# Patient Record
Sex: FEMALE | Race: OTHER | HISPANIC OR LATINO | Employment: FULL TIME | URBAN - METROPOLITAN AREA
[De-identification: names, ages, dates, MRNs, and addresses within clinical notes are randomized per-mention and may not be internally consistent; named-entity substitution may affect disease eponyms.]

---

## 2025-04-02 ENCOUNTER — OFFICE VISIT (OUTPATIENT)
Dept: WOUND CARE | Facility: CLINIC | Age: 40
End: 2025-04-02
Payer: COMMERCIAL

## 2025-04-02 PROCEDURE — 99213 OFFICE O/P EST LOW 20 MIN: CPT

## 2025-04-04 ENCOUNTER — HOSPITAL ENCOUNTER (OUTPATIENT)
Dept: RADIOLOGY | Facility: HOSPITAL | Age: 40
Discharge: HOME | End: 2025-04-04
Payer: COMMERCIAL

## 2025-04-04 DIAGNOSIS — M86.171 OTHER ACUTE OSTEOMYELITIS, RIGHT ANKLE AND FOOT (MULTI): ICD-10-CM

## 2025-04-04 DIAGNOSIS — M86.271 SUBACUTE OSTEOMYELITIS, RIGHT ANKLE AND FOOT (MULTI): ICD-10-CM

## 2025-04-04 PROCEDURE — 36569 INSJ PICC 5 YR+ W/O IMAGING: CPT

## 2025-04-04 PROCEDURE — C1751 CATH, INF, PER/CENT/MIDLINE: HCPCS

## 2025-04-04 PROCEDURE — 2780000003 HC OR 278 NO HCPCS

## 2025-04-08 PROCEDURE — 85025 COMPLETE CBC W/AUTO DIFF WBC: CPT

## 2025-04-08 PROCEDURE — 86140 C-REACTIVE PROTEIN: CPT

## 2025-04-08 PROCEDURE — 80048 BASIC METABOLIC PNL TOTAL CA: CPT

## 2025-04-09 ENCOUNTER — LAB REQUISITION (OUTPATIENT)
Dept: LAB | Facility: HOSPITAL | Age: 40
End: 2025-04-09
Payer: COMMERCIAL

## 2025-04-09 DIAGNOSIS — M86.271 SUBACUTE OSTEOMYELITIS, RIGHT ANKLE AND FOOT (MULTI): ICD-10-CM

## 2025-04-09 LAB
ANION GAP SERPL CALC-SCNC: 11 MMOL/L (ref 10–20)
BASOPHILS # BLD AUTO: 0.02 X10*3/UL (ref 0–0.1)
BASOPHILS NFR BLD AUTO: 0.3 %
BUN SERPL-MCNC: 16 MG/DL (ref 6–23)
CALCIUM SERPL-MCNC: 9.3 MG/DL (ref 8.6–10.6)
CHLORIDE SERPL-SCNC: 107 MMOL/L (ref 98–107)
CO2 SERPL-SCNC: 24 MMOL/L (ref 21–32)
CREAT SERPL-MCNC: 0.47 MG/DL (ref 0.5–1.05)
CRP SERPL-MCNC: 0.17 MG/DL
EGFRCR SERPLBLD CKD-EPI 2021: >90 ML/MIN/1.73M*2
EOSINOPHIL # BLD AUTO: 0.13 X10*3/UL (ref 0–0.7)
EOSINOPHIL NFR BLD AUTO: 2 %
ERYTHROCYTE [DISTWIDTH] IN BLOOD BY AUTOMATED COUNT: 13.3 % (ref 11.5–14.5)
GLUCOSE SERPL-MCNC: 126 MG/DL (ref 74–99)
HCT VFR BLD AUTO: 35.4 % (ref 36–46)
HGB BLD-MCNC: 11.5 G/DL (ref 12–16)
IMM GRANULOCYTES # BLD AUTO: 0.03 X10*3/UL (ref 0–0.7)
IMM GRANULOCYTES NFR BLD AUTO: 0.5 % (ref 0–0.9)
LYMPHOCYTES # BLD AUTO: 1.73 X10*3/UL (ref 1.2–4.8)
LYMPHOCYTES NFR BLD AUTO: 26.8 %
MCH RBC QN AUTO: 29.6 PG (ref 26–34)
MCHC RBC AUTO-ENTMCNC: 32.5 G/DL (ref 32–36)
MCV RBC AUTO: 91 FL (ref 80–100)
MONOCYTES # BLD AUTO: 0.54 X10*3/UL (ref 0.1–1)
MONOCYTES NFR BLD AUTO: 8.4 %
NEUTROPHILS # BLD AUTO: 4.01 X10*3/UL (ref 1.2–7.7)
NEUTROPHILS NFR BLD AUTO: 62 %
NRBC BLD-RTO: 0 /100 WBCS (ref 0–0)
PLATELET # BLD AUTO: 244 X10*3/UL (ref 150–450)
POTASSIUM SERPL-SCNC: 3.8 MMOL/L (ref 3.5–5.3)
RBC # BLD AUTO: 3.89 X10*6/UL (ref 4–5.2)
SODIUM SERPL-SCNC: 138 MMOL/L (ref 136–145)
WBC # BLD AUTO: 6.5 X10*3/UL (ref 4.4–11.3)

## 2025-04-16 ENCOUNTER — APPOINTMENT (OUTPATIENT)
Dept: WOUND CARE | Facility: CLINIC | Age: 40
End: 2025-04-16
Payer: COMMERCIAL

## 2025-04-23 ENCOUNTER — OFFICE VISIT (OUTPATIENT)
Dept: WOUND CARE | Facility: CLINIC | Age: 40
End: 2025-04-23
Payer: COMMERCIAL

## 2025-04-23 PROCEDURE — 99213 OFFICE O/P EST LOW 20 MIN: CPT

## 2025-05-07 ENCOUNTER — OFFICE VISIT (OUTPATIENT)
Dept: WOUND CARE | Facility: CLINIC | Age: 40
End: 2025-05-07
Payer: COMMERCIAL

## 2025-05-07 PROCEDURE — 99213 OFFICE O/P EST LOW 20 MIN: CPT

## 2025-07-02 ENCOUNTER — PRE-ADMISSION TESTING (OUTPATIENT)
Dept: PREADMISSION TESTING | Facility: HOSPITAL | Age: 40
End: 2025-07-02
Payer: COMMERCIAL

## 2025-07-02 VITALS
HEIGHT: 66 IN | OXYGEN SATURATION: 99 % | RESPIRATION RATE: 16 BRPM | TEMPERATURE: 95 F | WEIGHT: 200.62 LBS | SYSTOLIC BLOOD PRESSURE: 147 MMHG | BODY MASS INDEX: 32.24 KG/M2 | HEART RATE: 89 BPM | DIASTOLIC BLOOD PRESSURE: 88 MMHG

## 2025-07-02 DIAGNOSIS — L97.512 ULCER OF TOE OF RIGHT FOOT, WITH FAT LAYER EXPOSED (MULTI): ICD-10-CM

## 2025-07-02 DIAGNOSIS — M21.961: ICD-10-CM

## 2025-07-02 DIAGNOSIS — Z01.818 PRE-OP TESTING: Primary | ICD-10-CM

## 2025-07-02 PROCEDURE — 99204 OFFICE O/P NEW MOD 45 MIN: CPT | Performed by: NURSE PRACTITIONER

## 2025-07-02 PROCEDURE — 93005 ELECTROCARDIOGRAM TRACING: CPT

## 2025-07-02 RX ORDER — MULTIVITAMIN/IRON/FOLIC ACID 18MG-0.4MG
1 TABLET ORAL DAILY
COMMUNITY

## 2025-07-02 RX ORDER — METFORMIN HYDROCHLORIDE 500 MG/1
500 TABLET ORAL
COMMUNITY

## 2025-07-02 RX ORDER — LISINOPRIL 2.5 MG/1
2.5 TABLET ORAL DAILY
COMMUNITY

## 2025-07-02 RX ORDER — ATORVASTATIN CALCIUM 40 MG/1
40 TABLET, FILM COATED ORAL DAILY
COMMUNITY

## 2025-07-02 RX ORDER — DULAGLUTIDE 4.5 MG/.5ML
INJECTION, SOLUTION SUBCUTANEOUS WEEKLY
COMMUNITY

## 2025-07-02 ASSESSMENT — DUKE ACTIVITY SCORE INDEX (DASI)
CAN YOU RUN A SHORT DISTANCE: YES
CAN YOU WALK A BLOCK OR TWO ON LEVEL GROUND: YES
CAN YOU PARTICIPATE IN MODERATE RECREATIONAL ACTIVITIES LIKE GOLF, BOWLING, DANCING, DOUBLES TENNIS OR THROWING A BASEBALL OR FOOTBALL: YES
CAN YOU DO LIGHT WORK AROUND THE HOUSE LIKE DUSTING OR WASHING DISHES: YES
DASI METS SCORE: 8.3
CAN YOU WALK INDOORS, SUCH AS AROUND YOUR HOUSE: YES
TOTAL_SCORE: 45.45
CAN YOU DO HEAVY WORK AROUND THE HOUSE LIKE SCRUBBING FLOORS OR LIFTING AND MOVING HEAVY FURNITURE: YES
CAN YOU PARTICIPATE IN STRENOUS SPORTS LIKE SWIMMING, SINGLES TENNIS, FOOTBALL, BASKETBALL, OR SKIING: NO
CAN YOU DO YARD WORK LIKE RAKING LEAVES, WEEDING OR PUSHING A MOWER: YES
CAN YOU CLIMB A FLIGHT OF STAIRS OR WALK UP A HILL: YES
CAN YOU DO MODERATE WORK AROUND THE HOUSE LIKE VACUUMING, SWEEPING FLOORS OR CARRYING GROCERIES: YES
CAN YOU TAKE CARE OF YOURSELF (EAT, DRESS, BATHE, OR USE TOILET): YES
CAN YOU HAVE SEXUAL RELATIONS: NO

## 2025-07-02 NOTE — CPM/PAT H&P
CPM/PAT Evaluation       Name: Letty Perez (Letty Perez)  /Age: 1985/40 y.o.     In-Person       Chief Complaint: PAT for planned Right foot surgery    40 yr old female w/PHx of HTN, HLD, DM II, obesity (BMI 32), Right foot deformity and toe ulcer referred to PAT for planned Right foot osteotomy w/mini c-arm and open wound excision preparation & recipient site, allograft skin application w/Dr Raphael on 2025      Patient reports feeling overall well, denies fever, cough or recent infection. Denies hx of stroke, seizure or blood clot.  Reports remaining otherwise physically active working a physical job lifting boxes; denies cardiac or respiratory symptoms. Past surgical hx includes elbow repair (as a child), tonsillectomy, cataract extraction, wisdom teeth extraction, Right ankle ORIF, colonoscopy and most recently PICC line placement/removal (2025); denies past issues with anesthesia.      Followed by PCP (Terri PERES) 3/2025  Followed by infectious disease (Mary Varghese MD) 2025  Followed by gastroenterology (Daysi PERES) 2/10/2025        Medical History[1]    Surgical History[2]    Patient  reports that she is not currently sexually active.    Family History[3]    Allergies[4]    Prior to Admission medications    Not on File        A ten-point review of systems was completed and is otherwise negative except for what is mentioned in the HPI above.    Physical Exam  Vitals reviewed.   Constitutional:       Appearance: Normal appearance.   HENT:      Head: Normocephalic.      Mouth/Throat:      Mouth: Mucous membranes are moist.   Eyes:      Pupils: Pupils are equal, round, and reactive to light.      Comments: Corrective lenses   Cardiovascular:      Rate and Rhythm: Normal rate and regular rhythm.   Pulmonary:      Effort: Pulmonary effort is normal.      Breath sounds: Normal breath sounds.   Abdominal:      General: Bowel sounds are normal.   Musculoskeletal:          "General: Normal range of motion.      Comments: BHAVIK continuous glucose monitor - showing result 133   Skin:     General: Skin is warm and dry.   Neurological:      Mental Status: She is alert and oriented to person, place, and time.   Psychiatric:         Mood and Affect: Mood normal.          PAT AIRWAY:   Airway:     Mallampati::  II    TM distance::  >3 FB    Neck ROM::  Full  normal        Testing/Diagnostic: CBC, BMP    Patient Specialist/PCP: Terri PERES (PCP) 3/2025; Mary Varghese MD (Infectious disease) 4/2025; Daysi PERES (gastroenterology) 2/10/2025    Visit Vitals  /88   Pulse 89   Temp 35 °C (95 °F) (Temporal)   Resp 16   Ht 1.676 m (5' 6\")   Wt 91 kg (200 lb 9.9 oz)   SpO2 99%   BMI 32.38 kg/m²   Smoking Status Never   BSA 2.06 m²       DASI Risk Score      Flowsheet Row Pre-Admission Testing from 7/2/2025 in [facility]   Can you take care of yourself (eat, dress, bathe, or use toilet)?  2.75 filed at 07/02/2025 0821   Can you walk indoors, such as around your house? 1.75 filed at 07/02/2025 0821   Can you walk a block or two on level ground?  2.75 filed at 07/02/2025 0821   Can you climb a flight of stairs or walk up a hill? 5.5 filed at 07/02/2025 0821   Can you run a short distance? 8 filed at 07/02/2025 0821   Can you do light work around the house like dusting or washing dishes? 2.7 filed at 07/02/2025 0821   Can you do moderate work around the house like vacuuming, sweeping floors or carrying groceries? 3.5 filed at 07/02/2025 0821   Can you do heavy work around the house like scrubbing floors or lifting and moving heavy furniture?  8 filed at 07/02/2025 0821   Can you do yard work like raking leaves, weeding or pushing a mower? 4.5 filed at 07/02/2025 0821   Can you have sexual relations? 0 filed at 07/02/2025 0821   Can you participate in moderate recreational activities like golf, bowling, dancing, doubles tennis or throwing a baseball or football? 6 filed at " 07/02/2025 0821   Can you participate in strenous sports like swimming, singles tennis, football, basketball, or skiing? 0 filed at 07/02/2025 0821   DASI SCORE 45.45 filed at 07/02/2025 0821   METS Score (Will be calculated only when all the questions are answered) 8.3 filed at 07/02/2025 0821          Caprini DVT Assessment    No data to display       Modified Frailty Index    No data to display       [id number] Stroke Risk Points  Current as of a minute ago        N/A 0 to 9 Points:      Last Change: N/A          The SCV3IY3-CMDy risk score (Lip ALEX, et al. 2009. © 2010 American [employer]) quantifies the risk of stroke for a patient with atrial fibrillation. For patients without atrial fibrillation or under the age of 18 this score appears as N/A. Higher score values generally indicate higher risk of stroke.        This score is not applicable to this patient. Components are not calculated.          Revised Cardiac Risk Index    No data to display       Apfel Simplified Score    No data to display       Risk Analysis Index Results This Encounter    No data found in the last 10 encounters.       Stop Bang Score      Flowsheet Row Pre-Admission Testing from 7/2/2025 in [facility]   Do you snore loudly? 0 filed at 07/02/2025 0827   Do you often feel tired or fatigued after your sleep? 0 filed at 07/02/2025 0827   Has anyone ever observed you stop breathing in your sleep? 0 filed at 07/02/2025 0827   Do you have or are you being treated for high blood pressure? 0 filed at 07/02/2025 0827   Recent BMI (Calculated) 32.4 filed at 07/02/2025 0827   Is BMI greater than 35 kg/m2? 0=No filed at 07/02/2025 0827   Age older than 50 years old? 0=No filed at 07/02/2025 0827   Is your neck circumference greater than 17 inches (Male) or 16 inches (Female)? 0 filed at 07/02/2025 0827   Gender - Male 0=No filed at 07/02/2025 0827   STOP-BANG Total Score 0 filed at 07/02/2025 0827          Prodigy:  High Risk  Total Score: 0          ARISCAT Score for Postoperative Pulmonary Complications      Flowsheet Row Pre-Admission Testing from 7/2/2025 in [facility]   Age Calculated Score 0 filed at 07/02/2025 1025   Preoperative SpO2 0 filed at 07/02/2025 1025   Respiratory infection in the last month Either upper or lower (i.e., URI, bronchitis, pneumonia), with fever and antibiotic treatment 0 filed at 07/02/2025 1025   Preoperative anemia (Hgb less than 10 g/dl) 0 filed at 07/02/2025 1025   Surgical incision  0 filed at 07/02/2025 1025   Duration of surgery  0 filed at 07/02/2025 1025   Emergency Procedure  0 filed at 07/02/2025 1025   ARISCAT Total Score  0 filed at 07/02/2025 1025          Alex Perioperative Risk for Myocardial Infarction or Cardiac Arrest (SHANEKA)      Flowsheet Row Pre-Admission Testing from 7/2/2025 in [facility]   Calculated Age Score 0.82 filed at 07/02/2025 1026   Functional Status  0 filed at 07/02/2025 1026   ASA Class  -3.29 filed at 07/02/2025 1026   Creatinine 0 filed at 07/02/2025 1026   Type of Procedure  0.80 filed at 07/02/2025 1026   SHANEKA Total Score  -6.92 filed at 07/02/2025 1026   SHANEKA % 0.1 filed at 07/02/2025 1026            Assessment and Plan:     # HTN - hold lisinopril 24 hrs before surgery  # HLD - continue statin  # DM II - hold Jardiance 3 full days before surgery, hold metformin morning of surgery, continue basaglar (1/2 dose for concerns of hypoglycemia), on GLP one therapy, reviewed clear liquid diet day before surgery (patient verbalized understanding), continuous glucose monitor in use to LUE showing glucose 133 during visit; A1c 7.5 (3/4/2025), followed by PCP  # Obesity (BMI 32) - activity/diet discussed/encouraged  # Right foot deformity and toe ulcer - Right foot osteotomy w/mini c-arm and open wound excision preparation & recipient site, allograft skin application w/Dr Raphael on 7/9/2025     Ecg complete 7/2/2025 - NSR (86 bpm)  Medical  hx, Allergies, VS and Labs reviewed  Medications addressed w/pre-op instructions provided             [1]   Past Medical History:  Diagnosis Date    Type 2 diabetes mellitus    [2]   Past Surgical History:  Procedure Laterality Date    CATARACT EXTRACTION      ORIF WRIST FRACTURE      PICC LINE INSERTION WO IMAGING      infection of great right toe    TONSILLECTOMY     [3]   Family History  Problem Relation Name Age of Onset    Diabetes Mother      Cancer Paternal Grandmother     [4] No Known Allergies

## 2025-07-02 NOTE — PREPROCEDURE INSTRUCTIONS
Medication List            Accurate as of July 2, 2025  9:02 AM. Always use your most recent med list.                atorvastatin 40 mg tablet  Commonly known as: Lipitor  Medication Adjustments for Surgery: Take/Use as prescribed     b complex 0.4 mg tablet  Additional Medication Adjustments for Surgery: Take half of your usual dose on the morning of surgery     BASAGLAR TEMPO PEN(U-100)INSLN SUBQ  Medication Adjustments for Surgery: Take/Use as prescribed     Jardiance 25 mg tablet  Generic drug: empagliflozin  Additional Medication Adjustments for Surgery: Other (Comment)  Notes to patient: Hold 3 days prior to surgery     lisinopril 2.5 mg tablet  Medication Adjustments for Surgery: Take last dose 1 day (24 hours) before surgery     metFORMIN 500 mg tablet  Commonly known as: Glucophage  Medication Adjustments for Surgery: Do Not take on the morning of surgery     Trulicity 4.5 mg/0.5 mL pen injector  Generic drug: dulaglutide  Medication Adjustments for Surgery: Do Not take on the morning of surgery  Additional Medication Adjustments for Surgery: Other (Comment)  Notes to patient: Hold day of surgery, follow clear liquid diet day before surgery; patient verbalized understanding                              PRE-OPERATIVE INSTRUCTIONS FOR SURGERY        Preoperative Fasting Guidelines    Why must I stop eating and drinking before surgery?  With anesthesia, food or liquid in your stomach can enter your lungs causing serious complications  GLP-1 medications can slow the movement of food through your stomach and intestines.  This further increases the risk of food entering your lungs with anesthesia    When do I need to stop eating and drinking before my surgery?  To help ensure food has passed out of your stomach, START a clear liquid diet 24 hours before your surgery  On the day of your surgery/procedure, STOP all clear liquids 2 hours before your arrival time to the hospital/facility     A clear liquid diet  consists of clear liquids and foods that melt into a clear liquid (i.e. gelatin) and excludes solid foods and liquids you cannot see through (i.e. milk). Clears can and should contain sugar to obtain a sufficient number of calories.  A clear liquid diet includes  Clear, fat-free broth  Clear nutritional drinks  Pulp-free popsicles, vegetable and fruit juice  Gelatin  Coffee and tea without creamer or milk  Clear soda and sports drinks    Diabetic Patients  Clear liquids should not be sugar-free   Check your blood glucose levels as you normally do  If you have symptoms of low blood glucose (shakiness, sweating, dizziness, confusion) or high blood glucose (dry mouth, excessive thirst, frequent urination, blurry vision), check your blood glucose level  For low blood glucose increase your consumption of sugar-containing clear liquid   For high blood glucose, decrease your consumption of sugar-containing clears and treat as you normally would  If symptoms persist seek medical attention    Examples of GLP-1 Medications  Trruby Travis     -----------    *One of our staff members will call you ONE business day before your surgery, between 11am-2 pm to let you know the time to arrive.  If you have not received a call by 2 pm, call [phone]    *When you arrive at the hospital-->GO TO Registration on the ground floor  *Stop smoking 24 hours prior to surgery.  No Marijuana, CBD Oil or Vaping for 48 hours  *No alcohol 24 hours prior to surgery  *You will need a responsible adult to drive you home  -No acrylic nails or nail polish on at least one fingernail, NO polish on toes for foot surgery  -You may be asked to remove your dentures, partial plate, eyeglasses or contact lenses before going to surgery.  Please bring a case for these items.  -Body piercings need to be removed.  Jewelry and valuables should be left at home.  -Put on loose,   comfortable, clean clothing, that will accommodate bandages    *If you have any further questions about your pre-op instructions,  not mentioned in this handout, then call [phone]*    What you may be asked to bring to surgery:  ___Crutches, walker  ___CPAP machine  ___Urine specimen

## 2025-07-02 NOTE — H&P (VIEW-ONLY)
CPM/PAT Evaluation       Name: Letty Perez (Letty Perez)  /Age: 1985/40 y.o.     In-Person       Chief Complaint: PAT for planned Right foot surgery    40 yr old female w/PHx of HTN, HLD, DM II, obesity (BMI 32), Right foot deformity and toe ulcer referred to PAT for planned Right foot osteotomy w/mini c-arm and open wound excision preparation & recipient site, allograft skin application w/Dr Raphael on 2025      Patient reports feeling overall well, denies fever, cough or recent infection. Denies hx of stroke, seizure or blood clot.  Reports remaining otherwise physically active working a physical job lifting boxes; denies cardiac or respiratory symptoms. Past surgical hx includes elbow repair (as a child), tonsillectomy, cataract extraction, wisdom teeth extraction, Right ankle ORIF, colonoscopy and most recently PICC line placement/removal (2025); denies past issues with anesthesia.      Followed by PCP (Terri PERES) 3/2025  Followed by infectious disease (Mary Varghese MD) 2025  Followed by gastroenterology (Daysi PERES) 2/10/2025        Medical History[1]    Surgical History[2]    Patient  reports that she is not currently sexually active.    Family History[3]    Allergies[4]    Prior to Admission medications    Not on File        A ten-point review of systems was completed and is otherwise negative except for what is mentioned in the HPI above.    Physical Exam  Vitals reviewed.   Constitutional:       Appearance: Normal appearance.   HENT:      Head: Normocephalic.      Mouth/Throat:      Mouth: Mucous membranes are moist.   Eyes:      Pupils: Pupils are equal, round, and reactive to light.      Comments: Corrective lenses   Cardiovascular:      Rate and Rhythm: Normal rate and regular rhythm.   Pulmonary:      Effort: Pulmonary effort is normal.      Breath sounds: Normal breath sounds.   Abdominal:      General: Bowel sounds are normal.   Musculoskeletal:          "General: Normal range of motion.      Comments: BHAVIK continuous glucose monitor - showing result 133   Skin:     General: Skin is warm and dry.   Neurological:      Mental Status: She is alert and oriented to person, place, and time.   Psychiatric:         Mood and Affect: Mood normal.          PAT AIRWAY:   Airway:     Mallampati::  II    TM distance::  >3 FB    Neck ROM::  Full  normal        Testing/Diagnostic: CBC, BMP    Patient Specialist/PCP: Terri PERES (PCP) 3/2025; Mary Varghese MD (Infectious disease) 4/2025; Daysi PERES (gastroenterology) 2/10/2025    Visit Vitals  /88   Pulse 89   Temp 35 °C (95 °F) (Temporal)   Resp 16   Ht 1.676 m (5' 6\")   Wt 91 kg (200 lb 9.9 oz)   SpO2 99%   BMI 32.38 kg/m²   Smoking Status Never   BSA 2.06 m²       DASI Risk Score      Flowsheet Row Pre-Admission Testing [facility]/2/2025 in [facility]   Can you take care of yourself (eat, dress, bathe, or use toilet)?  2.75 filed at 07/02/2025 0821   Can you walk indoors, such as around your house? 1.75 filed at 07/02/2025 0821   Can you walk a block or two on level ground?  2.75 filed at 07/02/2025 0821   Can you climb a flight of stairs or walk up a hill? 5.5 filed at 07/02/2025 0821   Can you run a short distance? 8 filed at 07/02/2025 0821   Can you do light work around the house like dusting or washing dishes? 2.7 filed at 07/02/2025 0821   Can you do moderate work around the house like vacuuming, sweeping floors or carrying groceries? 3.5 filed at 07/02/2025 0821   Can you do heavy work around the house like scrubbing floors or lifting and moving heavy furniture?  8 filed at 07/02/2025 0821   Can you do yard work like raking leaves, weeding or pushing a mower? 4.5 filed at 07/02/2025 0821   Can you have sexual relations? 0 filed at 07/02/2025 0821   Can you participate in moderate recreational activities like golf, bowling, dancing, doubles tennis or throwing a baseball or football? 6 filed at " 07/02/2025 0821   Can you participate in strenous sports like swimming, singles tennis, football, basketball, or skiing? 0 filed at 07/02/2025 0821   DASI SCORE 45.45 filed at 07/02/2025 0821   METS Score (Will be calculated only when all the questions are answered) 8.3 filed at 07/02/2025 0821          Caprini DVT Assessment    No data to display       Modified Frailty Index    No data to display       [id number] Stroke Risk Points  Current as of a minute ago        N/A 0 to 9 Points:      Last Change: N/A          The ZAO0UX4-BXZb risk score (Lip ALEX, et al. 2009. © 2010 American College [employer] Chest [employer]) quantifies the risk of stroke for a patient with atrial fibrillation. For patients without atrial fibrillation or under the age of 18 this score appears as N/A. Higher score values generally indicate higher risk of stroke.        This score is not applicable to this patient. Components are not calculated.          Revised Cardiac Risk Index    No data to display       Apfel Simplified Score    No data to display       Risk Analysis Index Results This Encounter    No data found in the last 10 encounters.       Stop Bang Score      Flowsheet Row Pre-Admission Testing from 7/2/2025 in [facility]   Do you snore loudly? 0 filed at 07/02/2025 0827   Do you often feel tired or fatigued after your sleep? 0 filed at 07/02/2025 0827   Has anyone ever observed you stop breathing in your sleep? 0 filed at 07/02/2025 0827   Do you have or are you being treated for high blood pressure? 0 filed at 07/02/2025 0827   Recent BMI (Calculated) 32.4 filed at 07/02/2025 0827   Is BMI greater than 35 kg/m2? 0=No filed at 07/02/2025 0827   Age older than 50 years old? 0=No filed at 07/02/2025 0827   Is your neck circumference greater than 17 inches (Male) or 16 inches (Female)? 0 filed at 07/02/2025 0827   Gender - Male 0=No filed at 07/02/2025 0827   STOP-BANG Total Score 0 filed at 07/02/2025 0827          Prodigy:  High Risk  Total Score: 0          ARISCAT Score for Postoperative Pulmonary Complications      Flowsheet Row Pre-Admission Testing from 7/2/2025 in [facility]   Age Calculated Score 0 filed at 07/02/2025 1025   Preoperative SpO2 0 filed at 07/02/2025 1025   Respiratory infection in the last month Either upper or lower (i.e., URI, bronchitis, pneumonia), with fever and antibiotic treatment 0 filed at 07/02/2025 1025   Preoperative anemia (Hgb less than 10 g/dl) 0 filed at 07/02/2025 1025   Surgical incision  0 filed at 07/02/2025 1025   Duration of surgery  0 filed at 07/02/2025 1025   Emergency Procedure  0 filed at 07/02/2025 1025   ARISCAT Total Score  0 filed at 07/02/2025 1025          Alex Perioperative Risk for Myocardial Infarction or Cardiac Arrest (SHANEKA)      Flowsheet Row Pre-Admission Testing from 7/2/2025 in [facility]   Calculated Age Score 0.82 filed at 07/02/2025 1026   Functional Status  0 filed at 07/02/2025 1026   ASA Class  -3.29 filed at 07/02/2025 1026   Creatinine 0 filed at 07/02/2025 1026   Type of Procedure  0.80 filed at 07/02/2025 1026   SHANEKA Total Score  -6.92 filed at 07/02/2025 1026   SHANEKA % 0.1 filed at 07/02/2025 1026            Assessment and Plan:     # HTN - hold lisinopril 24 hrs before surgery  # HLD - continue statin  # DM II - hold Jardiance 3 full days before surgery, hold metformin morning of surgery, continue basaglar (1/2 dose for concerns of hypoglycemia), on GLP one therapy, reviewed clear liquid diet day before surgery (patient verbalized understanding), continuous glucose monitor in use to LUE showing glucose 133 during visit; A1c 7.5 (3/4/2025), followed by PCP  # Obesity (BMI 32) - activity/diet discussed/encouraged  # Right foot deformity and toe ulcer - Right foot osteotomy w/mini c-arm and open wound excision preparation & recipient site, allograft skin application w/Dr Raphael on 7/9/2025     Ecg complete 7/2/2025 - NSR (86 bpm)  Medical  hx, Allergies, VS and Labs reviewed  Medications addressed w/pre-op instructions provided             [1]   Past Medical History:  Diagnosis Date    Type 2 diabetes mellitus    [2]   Past Surgical History:  Procedure Laterality Date    CATARACT EXTRACTION      ORIF WRIST FRACTURE      PICC LINE INSERTION WO IMAGING      infection of great right toe    TONSILLECTOMY     [3]   Family History  Problem Relation Name Age of Onset    Diabetes Mother      Cancer Paternal Grandmother     [4] No Known Allergies

## 2025-07-03 LAB
ATRIAL RATE: 86 BPM
P AXIS: 42 DEGREES
P OFFSET: 185 MS
P ONSET: 140 MS
PR INTERVAL: 158 MS
Q ONSET: 219 MS
QRS COUNT: 14 BEATS
QRS DURATION: 88 MS
QT INTERVAL: 364 MS
QTC CALCULATION(BAZETT): 435 MS
QTC FREDERICIA: 410 MS
R AXIS: 14 DEGREES
T AXIS: 46 DEGREES
T OFFSET: 401 MS
VENTRICULAR RATE: 86 BPM

## 2025-07-09 ENCOUNTER — ANESTHESIA EVENT (OUTPATIENT)
Dept: OPERATING ROOM | Facility: HOSPITAL | Age: 40
End: 2025-07-09
Payer: COMMERCIAL

## 2025-07-09 ENCOUNTER — HOSPITAL ENCOUNTER (OUTPATIENT)
Facility: HOSPITAL | Age: 40
Setting detail: OUTPATIENT SURGERY
Discharge: HOME | End: 2025-07-09
Attending: PODIATRIST | Admitting: PODIATRIST
Payer: COMMERCIAL

## 2025-07-09 ENCOUNTER — APPOINTMENT (OUTPATIENT)
Dept: RADIOLOGY | Facility: HOSPITAL | Age: 40
End: 2025-07-09
Payer: COMMERCIAL

## 2025-07-09 ENCOUNTER — ANESTHESIA (OUTPATIENT)
Dept: OPERATING ROOM | Facility: HOSPITAL | Age: 40
End: 2025-07-09
Payer: COMMERCIAL

## 2025-07-09 VITALS
OXYGEN SATURATION: 94 % | SYSTOLIC BLOOD PRESSURE: 135 MMHG | HEART RATE: 85 BPM | RESPIRATION RATE: 16 BRPM | TEMPERATURE: 97.5 F | HEIGHT: 66 IN | WEIGHT: 200.62 LBS | BODY MASS INDEX: 32.24 KG/M2 | DIASTOLIC BLOOD PRESSURE: 75 MMHG

## 2025-07-09 PROBLEM — E11.42 DIABETIC POLYNEUROPATHY ASSOCIATED WITH TYPE 2 DIABETES MELLITUS: Status: ACTIVE | Noted: 2023-07-17

## 2025-07-09 PROBLEM — E11.9 TYPE 2 DIABETES MELLITUS: Status: ACTIVE | Noted: 2025-07-09

## 2025-07-09 PROBLEM — E66.9 OBESITY: Status: ACTIVE | Noted: 2025-07-09

## 2025-07-09 LAB
ATRIAL RATE: 86 BPM
GLUCOSE BLD MANUAL STRIP-MCNC: 116 MG/DL (ref 74–99)
P AXIS: 42 DEGREES
P OFFSET: 185 MS
P ONSET: 140 MS
PR INTERVAL: 158 MS
PREGNANCY TEST URINE, POC: NEGATIVE
Q ONSET: 219 MS
QRS COUNT: 14 BEATS
QRS DURATION: 88 MS
QT INTERVAL: 364 MS
QTC CALCULATION(BAZETT): 435 MS
QTC FREDERICIA: 410 MS
R AXIS: 14 DEGREES
T AXIS: 46 DEGREES
T OFFSET: 401 MS
VENTRICULAR RATE: 86 BPM

## 2025-07-09 PROCEDURE — A28308 PR OSTEOTOMY METATARSAL (NOT 1ST)

## 2025-07-09 PROCEDURE — 7100000009 HC PHASE TWO TIME - INITIAL BASE CHARGE: Performed by: PODIATRIST

## 2025-07-09 PROCEDURE — 3600000008 HC OR TIME - EACH INCREMENTAL 1 MINUTE - PROCEDURE LEVEL THREE: Performed by: PODIATRIST

## 2025-07-09 PROCEDURE — 2720000007 HC OR 272 NO HCPCS: Performed by: PODIATRIST

## 2025-07-09 PROCEDURE — 81025 URINE PREGNANCY TEST: CPT | Performed by: PODIATRIST

## 2025-07-09 PROCEDURE — 3600000003 HC OR TIME - INITIAL BASE CHARGE - PROCEDURE LEVEL THREE: Performed by: PODIATRIST

## 2025-07-09 PROCEDURE — 7100000010 HC PHASE TWO TIME - EACH INCREMENTAL 1 MINUTE: Performed by: PODIATRIST

## 2025-07-09 PROCEDURE — 2500000004 HC RX 250 GENERAL PHARMACY W/ HCPCS (ALT 636 FOR OP/ED): Performed by: PODIATRIST

## 2025-07-09 PROCEDURE — 2500000004 HC RX 250 GENERAL PHARMACY W/ HCPCS (ALT 636 FOR OP/ED)

## 2025-07-09 PROCEDURE — 2500000005 HC RX 250 GENERAL PHARMACY W/O HCPCS: Performed by: PODIATRIST

## 2025-07-09 PROCEDURE — 3700000001 HC GENERAL ANESTHESIA TIME - INITIAL BASE CHARGE: Performed by: PODIATRIST

## 2025-07-09 PROCEDURE — 82947 ASSAY GLUCOSE BLOOD QUANT: CPT

## 2025-07-09 PROCEDURE — A28308 PR OSTEOTOMY METATARSAL (NOT 1ST): Performed by: ANESTHESIOLOGY

## 2025-07-09 PROCEDURE — 3700000002 HC GENERAL ANESTHESIA TIME - EACH INCREMENTAL 1 MINUTE: Performed by: PODIATRIST

## 2025-07-09 PROCEDURE — 76000 FLUOROSCOPY <1 HR PHYS/QHP: CPT

## 2025-07-09 RX ORDER — LIDOCAINE HYDROCHLORIDE 10 MG/ML
0.1 INJECTION, SOLUTION INFILTRATION; PERINEURAL ONCE
Status: DISCONTINUED | OUTPATIENT
Start: 2025-07-09 | End: 2025-07-09 | Stop reason: HOSPADM

## 2025-07-09 RX ORDER — IPRATROPIUM BROMIDE 0.5 MG/2.5ML
500 SOLUTION RESPIRATORY (INHALATION) ONCE
Status: DISCONTINUED | OUTPATIENT
Start: 2025-07-09 | End: 2025-07-09 | Stop reason: HOSPADM

## 2025-07-09 RX ORDER — SODIUM CHLORIDE, SODIUM LACTATE, POTASSIUM CHLORIDE, CALCIUM CHLORIDE 600; 310; 30; 20 MG/100ML; MG/100ML; MG/100ML; MG/100ML
100 INJECTION, SOLUTION INTRAVENOUS CONTINUOUS
Status: DISCONTINUED | OUTPATIENT
Start: 2025-07-09 | End: 2025-07-09 | Stop reason: HOSPADM

## 2025-07-09 RX ORDER — PROCHLORPERAZINE EDISYLATE 5 MG/ML
5 INJECTION INTRAMUSCULAR; INTRAVENOUS ONCE AS NEEDED
Status: DISCONTINUED | OUTPATIENT
Start: 2025-07-09 | End: 2025-07-09 | Stop reason: HOSPADM

## 2025-07-09 RX ORDER — FENTANYL CITRATE 50 UG/ML
INJECTION, SOLUTION INTRAMUSCULAR; INTRAVENOUS AS NEEDED
Status: DISCONTINUED | OUTPATIENT
Start: 2025-07-09 | End: 2025-07-09

## 2025-07-09 RX ORDER — CEFAZOLIN SODIUM 2 G/100ML
INJECTION, SOLUTION INTRAVENOUS AS NEEDED
Status: DISCONTINUED | OUTPATIENT
Start: 2025-07-09 | End: 2025-07-09

## 2025-07-09 RX ORDER — KETOROLAC TROMETHAMINE 30 MG/ML
15 INJECTION, SOLUTION INTRAMUSCULAR; INTRAVENOUS ONCE AS NEEDED
Status: DISCONTINUED | OUTPATIENT
Start: 2025-07-09 | End: 2025-07-09 | Stop reason: HOSPADM

## 2025-07-09 RX ORDER — CEFAZOLIN SODIUM 2 G/50ML
2 SOLUTION INTRAVENOUS ONCE
Status: DISCONTINUED | OUTPATIENT
Start: 2025-07-09 | End: 2025-07-09 | Stop reason: HOSPADM

## 2025-07-09 RX ORDER — ONDANSETRON HYDROCHLORIDE 2 MG/ML
INJECTION, SOLUTION INTRAVENOUS AS NEEDED
Status: DISCONTINUED | OUTPATIENT
Start: 2025-07-09 | End: 2025-07-09

## 2025-07-09 RX ORDER — ALBUTEROL SULFATE 0.83 MG/ML
2.5 SOLUTION RESPIRATORY (INHALATION) ONCE AS NEEDED
Status: DISCONTINUED | OUTPATIENT
Start: 2025-07-09 | End: 2025-07-09 | Stop reason: HOSPADM

## 2025-07-09 RX ORDER — BUPIVACAINE HYDROCHLORIDE 2.5 MG/ML
INJECTION, SOLUTION INFILTRATION; PERINEURAL AS NEEDED
Status: DISCONTINUED | OUTPATIENT
Start: 2025-07-09 | End: 2025-07-09 | Stop reason: HOSPADM

## 2025-07-09 RX ORDER — SODIUM CHLORIDE, SODIUM LACTATE, POTASSIUM CHLORIDE, CALCIUM CHLORIDE 600; 310; 30; 20 MG/100ML; MG/100ML; MG/100ML; MG/100ML
INJECTION, SOLUTION INTRAVENOUS CONTINUOUS PRN
Status: DISCONTINUED | OUTPATIENT
Start: 2025-07-09 | End: 2025-07-09

## 2025-07-09 RX ORDER — MIDAZOLAM HYDROCHLORIDE 1 MG/ML
INJECTION, SOLUTION INTRAMUSCULAR; INTRAVENOUS AS NEEDED
Status: DISCONTINUED | OUTPATIENT
Start: 2025-07-09 | End: 2025-07-09

## 2025-07-09 RX ORDER — ONDANSETRON HYDROCHLORIDE 2 MG/ML
4 INJECTION, SOLUTION INTRAVENOUS ONCE AS NEEDED
Status: DISCONTINUED | OUTPATIENT
Start: 2025-07-09 | End: 2025-07-09 | Stop reason: HOSPADM

## 2025-07-09 RX ORDER — LIDOCAINE HCL/PF 100 MG/5ML
SYRINGE (ML) INTRAVENOUS AS NEEDED
Status: DISCONTINUED | OUTPATIENT
Start: 2025-07-09 | End: 2025-07-09

## 2025-07-09 RX ORDER — PROPOFOL 10 MG/ML
INJECTION, EMULSION INTRAVENOUS AS NEEDED
Status: DISCONTINUED | OUTPATIENT
Start: 2025-07-09 | End: 2025-07-09

## 2025-07-09 RX ORDER — SCOPOLAMINE 1 MG/3D
1 PATCH, EXTENDED RELEASE TRANSDERMAL ONCE
Status: DISCONTINUED | OUTPATIENT
Start: 2025-07-09 | End: 2025-07-09 | Stop reason: HOSPADM

## 2025-07-09 RX ORDER — LIDOCAINE HYDROCHLORIDE 10 MG/ML
INJECTION, SOLUTION INFILTRATION; PERINEURAL AS NEEDED
Status: DISCONTINUED | OUTPATIENT
Start: 2025-07-09 | End: 2025-07-09 | Stop reason: HOSPADM

## 2025-07-09 RX ORDER — ACETAMINOPHEN 325 MG/1
650 TABLET ORAL EVERY 4 HOURS PRN
Status: DISCONTINUED | OUTPATIENT
Start: 2025-07-09 | End: 2025-07-09 | Stop reason: HOSPADM

## 2025-07-09 RX ADMIN — ONDANSETRON 4 MG: 2 INJECTION, SOLUTION INTRAMUSCULAR; INTRAVENOUS at 08:49

## 2025-07-09 RX ADMIN — LIDOCAINE HYDROCHLORIDE 60 MG: 20 INJECTION INTRAVENOUS at 08:39

## 2025-07-09 RX ADMIN — SODIUM CHLORIDE, POTASSIUM CHLORIDE, SODIUM LACTATE AND CALCIUM CHLORIDE: 600; 310; 30; 20 INJECTION, SOLUTION INTRAVENOUS at 08:34

## 2025-07-09 RX ADMIN — FENTANYL CITRATE 50 MCG: 50 INJECTION, SOLUTION INTRAMUSCULAR; INTRAVENOUS at 08:39

## 2025-07-09 RX ADMIN — FENTANYL CITRATE 50 MCG: 50 INJECTION, SOLUTION INTRAMUSCULAR; INTRAVENOUS at 09:10

## 2025-07-09 RX ADMIN — PROPOFOL 30 MG: 10 INJECTION, EMULSION INTRAVENOUS at 08:46

## 2025-07-09 RX ADMIN — PROPOFOL 150 MCG/KG/MIN: 10 INJECTION, EMULSION INTRAVENOUS at 08:40

## 2025-07-09 RX ADMIN — CEFAZOLIN SODIUM 2 G: 2 INJECTION, SOLUTION INTRAVENOUS at 08:35

## 2025-07-09 RX ADMIN — POVIDONE-IODINE 1 APPLICATION: 5 SOLUTION TOPICAL at 07:16

## 2025-07-09 RX ADMIN — PROPOFOL 80 MG: 10 INJECTION, EMULSION INTRAVENOUS at 08:39

## 2025-07-09 RX ADMIN — MIDAZOLAM 2 MG: 1 INJECTION INTRAMUSCULAR; INTRAVENOUS at 08:36

## 2025-07-09 SDOH — HEALTH STABILITY: MENTAL HEALTH: CURRENT SMOKER: 0

## 2025-07-09 ASSESSMENT — COLUMBIA-SUICIDE SEVERITY RATING SCALE - C-SSRS
6. HAVE YOU EVER DONE ANYTHING, STARTED TO DO ANYTHING, OR PREPARED TO DO ANYTHING TO END YOUR LIFE?: NO
1. IN THE PAST MONTH, HAVE YOU WISHED YOU WERE DEAD OR WISHED YOU COULD GO TO SLEEP AND NOT WAKE UP?: NO
2. HAVE YOU ACTUALLY HAD ANY THOUGHTS OF KILLING YOURSELF?: NO

## 2025-07-09 ASSESSMENT — PAIN SCALES - GENERAL
PAINLEVEL_OUTOF10: 0 - NO PAIN
PAIN_LEVEL: 0

## 2025-07-09 ASSESSMENT — PAIN - FUNCTIONAL ASSESSMENT
PAIN_FUNCTIONAL_ASSESSMENT: 0-10
PAIN_FUNCTIONAL_ASSESSMENT: 0-10

## 2025-07-09 NOTE — DISCHARGE INSTRUCTIONS
PODIATRIC SURGERY POST-OP INSTRUCTIONS    YOU HAD ANESTHESIA:  You must have a responsible adult drive you home and stay with you for the first 24 hours.    Do not drive a car or drink any alcohol for 24 hours after surgery.  Do not do any strenuous work or activity for the next 24 hours.  You may have mild nausea, a sore throat or raspy voice for a few days.    You received a block prior to your procedure. You should expect the surgical extremity to remain numb for the next 24-48 hours.     POST-OP INSTRUCTIONS:  Keep dressing clean, dry and intact until your first post-operative appointment.  Do not remove surgical dressing. You may need to loosen if necessary.   Do not shower unless using a cast protector to protect dressing.  If dressing gets wet, please call office immediately as this can lead to increased risk of infection or wound dehiscence.   Elevate surgical extremity as much as possible to help with pain and swelling.  Weight Bearing Status: Bear weight as tolerated in surgical shoe.  Please resume all home medications.   Post-Operative Medications: You were prescribed Percocet for pain; please take as directed on the label.  Post-operative appointment with Dr. Chelita Raphael in 1 week. Please call to schedule if not already scheduled.  Should any problems, questions, or concerns arise, please call the clinic office.    WHEN TO CALL YOUR DOCTOR:  Fever (>100.4°F or 38.0°C) or chills.  Incision problems such as redness, warmth, swelling, or foul smelling drainage.  Severe nausea or persistent vomiting.  Pain and swelling in your legs, especially if it is only on one side and not the other.  Pain with urination, cloudy urine, or foul smelling urine.  Or if you have any other problems or questions.  CALL 911 or go to the ED if you have any shortness of breath, difficulty breathing, or chest pain.

## 2025-07-09 NOTE — ANESTHESIA POSTPROCEDURE EVALUATION
Patient: Letty Perez    Procedure Summary       Date: 07/09/25 Room / Location: [facility] 04 / [facility]    Anesthesia Start: 0834 Anesthesia Stop: 0918    Procedures:       RIGHT FOOT OSTEOTOMY WITH MINI C-ARM (Right)      OPEN WOUND EXCISION PREPARATION & RECIPIENT SITE (Right) Diagnosis:       Deformity, foot acquired, right      Ulcer of toe of right foot, with fat layer exposed (Multi)      (Deformity, foot acquired, right [M21.961])      (Ulcer of toe of right foot, with fat layer exposed (Multi) [L97.512])    Surgeons: Chelita Raphael DPM Responsible Provider: Joshua Munoz MD    Anesthesia Type: MAC ASA Status: 3            Anesthesia Type: MAC    Vitals Value Taken Time   /63 07/09/25 09:18   Temp 36.4 07/09/25 09:18   Pulse 88 07/09/25 09:18   Resp 16 07/09/25 09:18   SpO2 94% 07/09/25 09:18       Anesthesia Post Evaluation    Patient location during evaluation: PACU  Patient participation: complete - patient participated  Level of consciousness: awake and alert  Pain score: 0  Pain management: satisfactory to patient  Airway patency: patent  Cardiovascular status: acceptable and hemodynamically stable  Respiratory status: acceptable, nonlabored ventilation, spontaneous ventilation and room air  Hydration status: acceptable  Postoperative Nausea and Vomiting: none        There were no known notable events for this encounter.

## 2025-07-09 NOTE — ANESTHESIA PREPROCEDURE EVALUATION
Patient: Letty Perez    Procedure Information       Date/Time: 07/09/25 0830    Procedures:       RIGHT FOOT OSTEOTOMY WITH MINI C-ARM (Right)      OPEN WOUND EXCISION PREPARATION & RECIPIENT SITE (Right)    Location: [facility] / [facility]    Surgeons: Chelita Raphael DPM            Relevant Problems   Neuro   (+) Diabetic polyneuropathy associated with type 2 diabetes mellitus      Endocrine   (+) Diabetic polyneuropathy associated with type 2 diabetes mellitus   (+) Obesity   (+) Type 2 diabetes mellitus       Clinical information reviewed:      Problems              NPO Detail:  No data recorded     Physical Exam    Airway  Mallampati: III  TM distance: <3 FB  Neck ROM: full  Mouth opening: 3 or more finger widths     Cardiovascular - normal exam  Rhythm: regular  Rate: normal     Dental - normal exam     Pulmonary - normal exam   Abdominal            Anesthesia Plan    History of general anesthesia?: yes  History of complications of general anesthesia?: no    ASA 3     MAC     The patient is not a current smoker.    intravenous induction   Postoperative pain plan includes opioids.  Trial extubation is planned.  Anesthetic plan and risks discussed with patient.    Plan discussed with CRNA.

## 2025-07-09 NOTE — OP NOTE
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]     * Ulcer of toe of right foot, with fat layer exposed (Multi) [L97.512] Post-op Diagnosis     * Deformity, foot acquired, right [M21.961]     * Ulcer of toe of right foot, with fat layer exposed (Multi) [L97.512]     Procedures  RIGHT FOOT OSTEOTOMY WITH MINI C-ARM  99688 - DE OSTEOT W/WO LNGTH SHRT/CORRJ METAR XCP 1ST EA    OPEN WOUND EXCISION PREPARATION & RECIPIENT SITE  19162 - DE PREP SITE F/S/N/H/F/G/M/D GT 1ST 100 SQ CM/1PCT      Surgeons      * Chelita Raphael - Primary    Resident/Fellow/Other Assistant:  Surgeons and Role:     * Yosef Corral DPM - Resident - Assisting    Staff:   Circulator: Elsi Castaneda Person: Joann  Surgical Assistant: Meng    Anesthesia Staff: Anesthesiologist: Joshua Munoz MD  CRNA: JA Mckeon-CRNA    Procedure Summary  Anesthesia: Monitor Anesthesia Care  ASA: III  Estimated Blood Loss: Minimal  Intra-op Medications: * Intraprocedure medication information is unavailable because the case start and end events have not been set *           Anesthesia Record               Intraprocedure I/O Totals          Intake    LR infusion 400.00 mL    Total Intake 400 mL          Specimen: No specimens collected              Drains and/or Catheters: * None in log *    Tourniquet Times:   * Missing tourniquet times found for documented tourniquets in lo *       Indications: Letty Perez is an 40 y.o. 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     The patient was seen in the preoperative area. The risks, benefits, complications, treatment  options, non-operative alternatives, expected recovery and outcomes were discussed with the patient. The possibilities of reaction to medication, pulmonary aspiration, injury to surrounding structures, bleeding, recurrent infection, the need for additional procedures, failure to diagnose a condition, and creating a complication requiring transfusion or operation were discussed with the patient. The patient concurred with the proposed plan, giving informed consent.  The site of surgery was properly noted/marked if necessary per policy. The patient has been actively warmed in preoperative area. Preoperative antibiotics have been ordered and given within 1 hours of incision. Venous thrombosis prophylaxis have been ordered including unilateral sequential compression device    This is a 40 y.o. female  patient presents to [facility] for an elective surgery of the right lower extremity.  The patient has failed conservative treatment and wished to proceed with surgical intervention.  The nature of the deformity, problems, anticipated procedures, postop recovery, convalescence, risks, complications including, but not limited to numbness, CRPS, over or under correction, problems healing soft tissue or bone, persistent pain, disability, requiring multiple future surgeries, delayed union nonunion malunion ,  loss of limb, loss of life, have been explained to the patient in detail.  All questions have been answered to the patient's satisfaction.  No promises or guarantees have been given.      Procedure Details: Under mild sedation, the patient was brought to operating room, placed on operating table in supine position.    The right lower extremity was then scrubbed, prepped, and draped in usual aseptic manner.        Procedure #1:  5th metatarsal osteotomy, right foot  Attention was then directed to the lateral fifth metatarsal neck.  Bleeding incision was then made.  Incision was then deepened down to the capsule.   Capsulotomy was then performed.  Using the MIS Arthrex bur, fifth metatarsal osteotomy was then performed at the fifth metatarsal neck.  Incision was then flushed with copious amount normal saline.  Incision was then closed with 3-0 Prolene.    Procedure #2: Preparation of ulceration  Attention was then directed to the ulceration of the plantar subfifth metatarsal head and lateral malleolus.  Using a 15 blade, excisional debridement was then performed down to including the subcutaneous tissue.    Any bleeders were then cauterized and achieved with cauterization and compression.    The incisions were then closed by layer.     The incisions were dressed with Betadine-soaked Adaptic, gauze, Kerlix, and Ace bandage.   A compressive dressing was then applied to the right lower extremity.      The patient tolerated procedure and anesthesia well in apparent satisfactory condition.      The patient was then transferred to PACU, vital signs stable and vascular status intact to digits for monitoring prior to discharge.     Evidence of Infection: No   Complications:  None; patient tolerated the procedure well.    Disposition: PACU - hemodynamically stable.  Condition: stable       Cheilta Raphael  Phone Number: [phone]

## 2025-07-09 NOTE — BRIEF OP NOTE
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]     * Ulcer of toe of right foot, with fat layer exposed (Multi) [L97.512]     Procedures  RIGHT FOOT OSTEOTOMY WITH MINI C-ARM  08993 - DC OSTEOT W/WO LNGTH SHRT/CORRJ METAR XCP 1ST EA    OPEN WOUND EXCISION PREPARATION & RECIPIENT SITE  15892 - DC PREP SITE F/S/N/H/F/G/M/D GT 1ST 100 SQ CM/1PCT      Surgeons      * Chelita Raphael - Primary    Resident/Fellow/Other Assistant:  Surgeons and Role:     * Yosef Corral DPM - Resident - Assisting    Staff:   Circulator: Elsi Castaneda Person: Joann  Surgical Assistant: Meng    Anesthesia Staff: Anesthesiologist: Joshua Munoz MD  CRNA: JA Mckeon-CRNA    Procedure Summary  Anesthesia: Monitor Anesthesia Care  ASA: III  Estimated Blood Loss: 0.5 mL  Intra-op Medications: * Intraprocedure medication information is unavailable because the case start and end events have not been set *           Anesthesia Record               Intraprocedure I/O Totals          Intake    LR infusion 400.00 mL    Total Intake 400 mL          Specimen: No specimens collected               Findings: Intraoperative finding consistent with clinical and radiographic findings    Complications:  None; patient tolerated the procedure well.     Disposition: PACU - hemodynamically stable.  Condition: stable  Specimens Collected: No specimens collected  Attending Attestation: I was present and scrubbed for the entire procedure.    Chelita Raphael  Phone Number: [phone]

## 2025-07-10 ASSESSMENT — PAIN SCALES - GENERAL: PAINLEVEL_OUTOF10: 3

## (undated) DEVICE — BLADE, BEAVER, MINI, 15, STAINLESS STEEL

## (undated) DEVICE — BANDAGE, COFLEX, 6 X 5 YDS, FOAM TAN, STERILE, LF

## (undated) DEVICE — DRAPE, C-ARM, FOOT SWITCH COVER

## (undated) DEVICE — NEEDLE, HYPODERMIC, SAFETYGLIDE, 21 G X 1.5 IN

## (undated) DEVICE — APPLICATOR, CHLORAPREP, W/ORANGE TINT, 26ML

## (undated) DEVICE — SPONGE, LAP, XRAY DECT, 18IN X 18IN, W/LOOP, STERILE

## (undated) DEVICE — HANDLE, PH, FOR YANKAUER SUCTION DEVICE

## (undated) DEVICE — DRAPE, SHEET, 17 X 23 IN

## (undated) DEVICE — WOUND SYSTEM, DEBRIDEMENT & CLEANING, O.R DUOPAK

## (undated) DEVICE — NEEDLE, HYPODERMIC, MONOJECT, 27 G X 1.5 IN

## (undated) DEVICE — SLEEVE, VASO PRESS, CALF GARMENT, MEDIUM, GREEN

## (undated) DEVICE — BANDAGE, ESMARK 4 IN X 9 FT, STERILE

## (undated) DEVICE — DRAPE, SHEET, U, W/ADHESIVE STRIP, IMPERVIOUS, 60 X 70 IN, DISPOSABLE, LF, STERILE

## (undated) DEVICE — Device

## (undated) DEVICE — CATHETER, IV, INSYTE, AUTOGUARD BC GN, 18 GA X 1.16IN

## (undated) DEVICE — DRAPE, SHEET, THREE QUARTER, FAN FOLD, 57 X 77 IN

## (undated) DEVICE — STOCKINETTE, IMPERVIOUS, 12 X 48 IN, ACCORDION